# Patient Record
Sex: FEMALE | Race: WHITE | NOT HISPANIC OR LATINO | URBAN - METROPOLITAN AREA
[De-identification: names, ages, dates, MRNs, and addresses within clinical notes are randomized per-mention and may not be internally consistent; named-entity substitution may affect disease eponyms.]

---

## 2021-11-13 ENCOUNTER — WEB ENCOUNTER (OUTPATIENT)
Dept: URBAN - METROPOLITAN AREA CLINIC 35 | Facility: CLINIC | Age: 61
End: 2021-11-13

## 2022-01-13 ENCOUNTER — WEB ENCOUNTER (OUTPATIENT)
Dept: URBAN - METROPOLITAN AREA CLINIC 35 | Facility: CLINIC | Age: 62
End: 2022-01-13

## 2022-01-25 ENCOUNTER — OFFICE VISIT (OUTPATIENT)
Dept: URBAN - METROPOLITAN AREA CLINIC 31 | Facility: CLINIC | Age: 62
End: 2022-01-25
Payer: COMMERCIAL

## 2022-01-25 VITALS
BODY MASS INDEX: 27.31 KG/M2 | WEIGHT: 160 LBS | OXYGEN SATURATION: 98 % | HEIGHT: 64 IN | HEART RATE: 87 BPM | DIASTOLIC BLOOD PRESSURE: 86 MMHG | SYSTOLIC BLOOD PRESSURE: 138 MMHG

## 2022-01-25 DIAGNOSIS — Z86.010 HX OF ADENOMATOUS COLONIC POLYPS: ICD-10-CM

## 2022-01-25 DIAGNOSIS — K62.5 RECTAL BLEEDING: ICD-10-CM

## 2022-01-25 DIAGNOSIS — K58.9 IRRITABLE BOWEL SYNDROME, UNSPECIFIED TYPE: ICD-10-CM

## 2022-01-25 PROCEDURE — 99203 OFFICE O/P NEW LOW 30 MIN: CPT | Performed by: INTERNAL MEDICINE

## 2022-01-25 RX ORDER — DICYCLOMINE HYDROCHLORIDE 20 MG/1
1 TABLET ORAL
Status: ACTIVE | COMMUNITY

## 2022-01-25 RX ORDER — SODIUM PICOSULFATE, MAGNESIUM OXIDE, AND ANHYDROUS CITRIC ACID 10; 3.5; 12 MG/160ML; G/160ML; G/160ML
160 ML LIQUID ORAL
Qty: 320 MILLILITER | Refills: 0 | OUTPATIENT
Start: 2022-01-25 | End: 2022-01-26

## 2022-01-25 RX ORDER — MELOXICAM 7.5 MG/1
1 TABLET TABLET ORAL ONCE A DAY
Status: DISCONTINUED | COMMUNITY

## 2022-01-25 RX ORDER — ATORVASTATIN CALCIUM 10 MG/1
1 TABLET TABLET, FILM COATED ORAL ONCE A DAY
Status: ACTIVE | COMMUNITY

## 2022-01-25 RX ORDER — LORATADINE 10 MG
1 TABLET TABLET ORAL ONCE A DAY
Status: ACTIVE | COMMUNITY

## 2022-01-25 NOTE — HPI-IBS-D
Patient presents today to discuss her bowel habits. She has also had some rectal bleeding and states that she has hemorrhoids. Patient is currently having 2 bowel movements per day. Her stools are always normal to softly formed. She admits blood in her stool and states that the color is bright red. Patient admits seeing blood on the tissue after wiping and also will sometimes stain the toilet water. Patient states that the rectal bleeding can be with and without having a bowel movement. She has not seen any mucous in her stool. Patient denies melana. admits to lower abdominal cramping with urgency and sometimes she will not have a bowel movement. The cramping and urgency will sometimes resolve on its own. She currently uses Dicyclomine 20 mg, once every night. The Dicyclomine helps, patient states that "it clams things down". Patient admits associated bloating and gas. When patient has a bowel movement she states that she does not feel like she is completely emptying her bowels. Patient denies fecal incontinence (conscious/unconscious).  Patient denies recently drinking lake or well water. She took antibiotics for dental work, about 6 months ago. Patient used to take Meloxicam, daily, and stopped about 8 weeks ago. She took this for about a year. She had a screening colonoscopy in 2016. Patient was referred to GI for a colonoscopy to asses her symptoms. Previous colonoscopy report and pathology report have been scanned into EMR.

## 2022-01-25 NOTE — PHYSICAL EXAM GASTROINTESTINAL
Abdomen,  soft, mildly tender lower abdomen, nondistended,  no guarding or rigidity,  no masses palpable,  normal bowel sounds

## 2022-01-31 ENCOUNTER — OFFICE VISIT (OUTPATIENT)
Dept: URBAN - METROPOLITAN AREA SURGERY CENTER 8 | Facility: SURGERY CENTER | Age: 62
End: 2022-01-31
Payer: COMMERCIAL

## 2022-01-31 DIAGNOSIS — K62.5 RECTAL BLEEDING: ICD-10-CM

## 2022-01-31 DIAGNOSIS — K63.5 BENIGN COLON POLYP: ICD-10-CM

## 2022-01-31 DIAGNOSIS — D12.3 ADENOMA OF TRANSVERSE COLON: ICD-10-CM

## 2022-01-31 PROCEDURE — 45380 COLONOSCOPY AND BIOPSY: CPT | Performed by: INTERNAL MEDICINE

## 2022-01-31 PROCEDURE — G8907 PT DOC NO EVENTS ON DISCHARG: HCPCS | Performed by: INTERNAL MEDICINE

## 2022-01-31 PROCEDURE — 45385 COLONOSCOPY W/LESION REMOVAL: CPT | Performed by: INTERNAL MEDICINE

## 2022-02-18 ENCOUNTER — OFFICE VISIT (OUTPATIENT)
Dept: URBAN - METROPOLITAN AREA CLINIC 31 | Facility: CLINIC | Age: 62
End: 2022-02-18
Payer: COMMERCIAL

## 2022-02-18 VITALS
OXYGEN SATURATION: 96 % | BODY MASS INDEX: 26.98 KG/M2 | SYSTOLIC BLOOD PRESSURE: 122 MMHG | WEIGHT: 158 LBS | HEIGHT: 64 IN | HEART RATE: 92 BPM | DIASTOLIC BLOOD PRESSURE: 72 MMHG

## 2022-02-18 DIAGNOSIS — K57.30 DIVERTICULOSIS OF LARGE INTESTINE WITHOUT PERFORATION OR ABSCESS WITHOUT BLEEDING: ICD-10-CM

## 2022-02-18 DIAGNOSIS — D12.3 ADENOMATOUS POLYP OF TRANSVERSE COLON: ICD-10-CM

## 2022-02-18 DIAGNOSIS — K62.5 RECTAL BLEEDING: ICD-10-CM

## 2022-02-18 DIAGNOSIS — K64.2 THIRD DEGREE HEMORRHOIDS: ICD-10-CM

## 2022-02-18 DIAGNOSIS — Z86.010 HX OF ADENOMATOUS COLONIC POLYPS: ICD-10-CM

## 2022-02-18 DIAGNOSIS — K58.9 IRRITABLE BOWEL SYNDROME, UNSPECIFIED TYPE: ICD-10-CM

## 2022-02-18 PROCEDURE — 99213 OFFICE O/P EST LOW 20 MIN: CPT | Performed by: INTERNAL MEDICINE

## 2022-02-18 RX ORDER — DICYCLOMINE HYDROCHLORIDE 20 MG/1
1 TABLET ORAL
Status: ACTIVE | COMMUNITY

## 2022-02-18 RX ORDER — LORATADINE 10 MG
1 TABLET TABLET ORAL ONCE A DAY
Status: ACTIVE | COMMUNITY

## 2022-02-18 RX ORDER — AMITRIPTYLINE HYDROCHLORIDE 10 MG/1
1 TABLET AT BEDTIME TABLET, FILM COATED ORAL ONCE A DAY
Qty: 30 | Refills: 3 | OUTPATIENT
Start: 2022-02-18

## 2022-02-18 RX ORDER — ATORVASTATIN CALCIUM 10 MG/1
1 TABLET TABLET, FILM COATED ORAL ONCE A DAY
Status: ACTIVE | COMMUNITY

## 2022-02-18 NOTE — HPI-COLONOSCOPY FOLLOWUP
Patient presents today for a follow-up after the colonoscopy that was done on 01/31/2022. Since the procedure patient states that she is having 1-2 bowel movements per day with normal and formed stools. Patient denies seeing any blood or mucous in her stool. Patient denies melena. Patient states that she had no complications following her colonoscopy. Patient did add in the Benefiber, 1 tablespoonful, but she is not taking it daily. She denies improvement in the urgency and lower abdominal cramping. Patient denies any rectal bleeding since having the colonoscopy.

## 2022-03-14 ENCOUNTER — ERX REFILL RESPONSE (OUTPATIENT)
Dept: URBAN - METROPOLITAN AREA CLINIC 31 | Facility: CLINIC | Age: 62
End: 2022-03-14

## 2022-03-14 RX ORDER — AMITRIPTYLINE HYDROCHLORIDE 10 MG/1
1 TABLET AT BEDTIME TABLET, FILM COATED ORAL ONCE A DAY
Qty: 30 | Refills: 3 | OUTPATIENT

## 2022-03-14 RX ORDER — AMITRIPTYLINE HYDROCHLORIDE 10 MG/1
TAKE 1 TABLET BY MOUTH EVERY DAY AT BEDTIME FOR 30 DAYS TABLET, FILM COATED ORAL
Qty: 30 TABLET | Refills: 4 | OUTPATIENT

## 2022-03-29 ENCOUNTER — OFFICE VISIT (OUTPATIENT)
Dept: URBAN - METROPOLITAN AREA CLINIC 31 | Facility: CLINIC | Age: 62
End: 2022-03-29
Payer: COMMERCIAL

## 2022-03-29 VITALS
HEART RATE: 90 BPM | DIASTOLIC BLOOD PRESSURE: 70 MMHG | HEIGHT: 64 IN | OXYGEN SATURATION: 97 % | SYSTOLIC BLOOD PRESSURE: 112 MMHG | BODY MASS INDEX: 27.83 KG/M2 | WEIGHT: 163 LBS

## 2022-03-29 DIAGNOSIS — D12.3 ADENOMATOUS POLYP OF TRANSVERSE COLON: ICD-10-CM

## 2022-03-29 DIAGNOSIS — K57.30 DIVERTICULOSIS OF LARGE INTESTINE WITHOUT PERFORATION OR ABSCESS WITHOUT BLEEDING: ICD-10-CM

## 2022-03-29 DIAGNOSIS — Z86.010 HX OF ADENOMATOUS COLONIC POLYPS: ICD-10-CM

## 2022-03-29 DIAGNOSIS — K64.2 THIRD DEGREE HEMORRHOIDS: ICD-10-CM

## 2022-03-29 DIAGNOSIS — K62.5 RECTAL BLEEDING: ICD-10-CM

## 2022-03-29 DIAGNOSIS — K58.9 IRRITABLE BOWEL SYNDROME, UNSPECIFIED TYPE: ICD-10-CM

## 2022-03-29 PROCEDURE — 99213 OFFICE O/P EST LOW 20 MIN: CPT | Performed by: INTERNAL MEDICINE

## 2022-03-29 RX ORDER — ATORVASTATIN CALCIUM 10 MG/1
1 TABLET TABLET, FILM COATED ORAL ONCE A DAY
Status: ACTIVE | COMMUNITY

## 2022-03-29 RX ORDER — DICYCLOMINE HYDROCHLORIDE 20 MG/1
1 TABLET ORAL
Status: ACTIVE | COMMUNITY

## 2022-03-29 RX ORDER — AMITRIPTYLINE HYDROCHLORIDE 10 MG/1
2 TABLETS AT BEDTIME TABLET, FILM COATED ORAL ONCE A DAY
Qty: 60 TABLET | Refills: 5 | OUTPATIENT

## 2022-03-29 RX ORDER — LORATADINE 10 MG
1 TABLET TABLET ORAL ONCE A DAY
Status: ACTIVE | COMMUNITY

## 2022-03-29 RX ORDER — AMITRIPTYLINE HYDROCHLORIDE 10 MG/1
TAKE 1 TABLET BY MOUTH EVERY DAY AT BEDTIME FOR 30 DAYS TABLET, FILM COATED ORAL
Qty: 30 TABLET | Refills: 4 | Status: ACTIVE | COMMUNITY

## 2022-03-29 NOTE — HPI-IBS
Patient presents today for a follow-up office visit from 02/18/2022. She is currently taking Amitriptyline 10 mg, daily and Benefiber 1 tablespoonful daily. Patient is currently having 2 normal and formed bowel movements per day. She denies any blood or mucous in her stool. Patient denies melena. She has not had to use the Dicyclomine very much, if at all. Patient states that she has been feeling well and has not had any abdominal pain episodes. She would like to discuss possibly going up in strength with the Amitriptyline.   She feels stomach is a bit more upset later in the day.

## 2022-06-16 ENCOUNTER — ERX REFILL RESPONSE (OUTPATIENT)
Dept: URBAN - METROPOLITAN AREA CLINIC 31 | Facility: CLINIC | Age: 62
End: 2022-06-16

## 2022-06-16 RX ORDER — AMITRIPTYLINE HYDROCHLORIDE 10 MG/1
2 TABLETS AT BEDTIME TABLET, FILM COATED ORAL ONCE A DAY
Qty: 60 TABLET | Refills: 5 | OUTPATIENT

## 2022-06-21 ENCOUNTER — OFFICE VISIT (OUTPATIENT)
Dept: URBAN - METROPOLITAN AREA CLINIC 31 | Facility: CLINIC | Age: 62
End: 2022-06-21
Payer: COMMERCIAL

## 2022-06-21 VITALS
HEIGHT: 64 IN | HEART RATE: 91 BPM | OXYGEN SATURATION: 98 % | BODY MASS INDEX: 27.31 KG/M2 | SYSTOLIC BLOOD PRESSURE: 130 MMHG | DIASTOLIC BLOOD PRESSURE: 86 MMHG | WEIGHT: 160 LBS

## 2022-06-21 DIAGNOSIS — D12.3 ADENOMATOUS POLYP OF TRANSVERSE COLON: ICD-10-CM

## 2022-06-21 DIAGNOSIS — K64.2 THIRD DEGREE HEMORRHOIDS: ICD-10-CM

## 2022-06-21 DIAGNOSIS — K58.9 IRRITABLE BOWEL SYNDROME, UNSPECIFIED TYPE: ICD-10-CM

## 2022-06-21 DIAGNOSIS — K62.5 RECTAL BLEEDING: ICD-10-CM

## 2022-06-21 DIAGNOSIS — K57.30 DIVERTICULOSIS OF LARGE INTESTINE WITHOUT PERFORATION OR ABSCESS WITHOUT BLEEDING: ICD-10-CM

## 2022-06-21 DIAGNOSIS — Z86.010 HX OF ADENOMATOUS COLONIC POLYPS: ICD-10-CM

## 2022-06-21 PROCEDURE — 99214 OFFICE O/P EST MOD 30 MIN: CPT | Performed by: INTERNAL MEDICINE

## 2022-06-21 RX ORDER — AMITRIPTYLINE HYDROCHLORIDE 25 MG/1
1 TABLET AT BEDTIME TABLET, FILM COATED ORAL ONCE A DAY
Qty: 30 TABLET | Refills: 5 | OUTPATIENT

## 2022-06-21 RX ORDER — LORATADINE 10 MG
1 TABLET TABLET ORAL ONCE A DAY
Status: ACTIVE | COMMUNITY

## 2022-06-21 RX ORDER — ATORVASTATIN CALCIUM 10 MG/1
1 TABLET TABLET, FILM COATED ORAL ONCE A DAY
Status: ACTIVE | COMMUNITY

## 2022-06-21 RX ORDER — AMITRIPTYLINE HYDROCHLORIDE 10 MG/1
TAKE 1 TABLET BY MOUTH EVERY DAY AT BEDTIME FOR 30 DAYS TABLET, FILM COATED ORAL
Qty: 30 TABLET | Refills: 4 | Status: ACTIVE | COMMUNITY

## 2022-06-21 RX ORDER — AMITRIPTYLINE HYDROCHLORIDE 10 MG/1
2 TABLETS AT BEDTIME TABLET, FILM COATED ORAL ONCE A DAY
Qty: 60 TABLET | Refills: 5 | Status: DISCONTINUED | COMMUNITY

## 2022-06-21 RX ORDER — SODIUM PICOSULFATE, MAGNESIUM OXIDE, AND ANHYDROUS CITRIC ACID 10; 3.5; 12 MG/160ML; G/160ML; G/160ML
160 ML LIQUID ORAL
Qty: 320 ML | Refills: 0 | OUTPATIENT
Start: 2022-06-21 | End: 2022-06-22

## 2022-06-21 RX ORDER — DICYCLOMINE HYDROCHLORIDE 20 MG/1
1 TABLET ORAL
Status: ACTIVE | COMMUNITY

## 2022-06-21 NOTE — HPI-IBS
Patient presents today for a follow-up office visit from 03/29/2022. She is currently taking Amitriptyline 10 mg, daily and Benefiber 1 tablespoonful daily. She tried taking the 20 mg of Amitriptyline at 20 mg daily and did not notice much of a difference. Patient is currently having 2 bowel movements per day (in the mornings) with normal to loose stools. She admits mucous in her stool. Patient denies melena. Patient admits intermittent blood in her stool and this is not a new issue. Last night she woke up and felt fecal urgency. She "pushed a little and was unable to produce a bowel movement. When she finished she realized that there was bright red blood in the toilet. She believes that there might have been some blood clots that passed. The blood stained the toilet bowel. Patient states that she has used Dicyclomine "a couple of times", since her last office visit. She continues to admit intermittent abdominal cramping. Patient is due for a repeat colonoscopy to reassess polypectomy site. Patient is now taking magnesium at night.

## 2022-07-17 ENCOUNTER — ERX REFILL RESPONSE (OUTPATIENT)
Dept: URBAN - METROPOLITAN AREA CLINIC 31 | Facility: CLINIC | Age: 62
End: 2022-07-17

## 2022-07-17 RX ORDER — AMITRIPTYLINE HYDROCHLORIDE 25 MG/1
TAKE 1 TABLET BY MOUTH EVERY DAY AT BEDTIME FOR 30 DAYS TABLET, FILM COATED ORAL
Qty: 30 TABLET | Refills: 5 | OUTPATIENT

## 2022-07-17 RX ORDER — AMITRIPTYLINE HYDROCHLORIDE 25 MG/1
1 TABLET AT BEDTIME TABLET, FILM COATED ORAL ONCE A DAY
Qty: 30 TABLET | Refills: 6 | OUTPATIENT

## 2022-08-16 ENCOUNTER — WEB ENCOUNTER (OUTPATIENT)
Dept: URBAN - METROPOLITAN AREA SURGERY CENTER 8 | Facility: SURGERY CENTER | Age: 62
End: 2022-08-16

## 2022-08-22 ENCOUNTER — OFFICE VISIT (OUTPATIENT)
Dept: URBAN - METROPOLITAN AREA SURGERY CENTER 8 | Facility: SURGERY CENTER | Age: 62
End: 2022-08-22
Payer: COMMERCIAL

## 2022-08-22 DIAGNOSIS — Z86.010 ADENOMAS PERSONAL HISTORY OF COLONIC POLYPS: ICD-10-CM

## 2022-08-22 DIAGNOSIS — K63.5 BENIGN COLON POLYP: ICD-10-CM

## 2022-08-22 PROCEDURE — G8907 PT DOC NO EVENTS ON DISCHARG: HCPCS | Performed by: INTERNAL MEDICINE

## 2022-08-22 PROCEDURE — 45380 COLONOSCOPY AND BIOPSY: CPT | Performed by: INTERNAL MEDICINE

## 2022-09-13 ENCOUNTER — OFFICE VISIT (OUTPATIENT)
Dept: URBAN - METROPOLITAN AREA CLINIC 31 | Facility: CLINIC | Age: 62
End: 2022-09-13
Payer: COMMERCIAL

## 2022-09-13 VITALS
OXYGEN SATURATION: 97 % | DIASTOLIC BLOOD PRESSURE: 84 MMHG | SYSTOLIC BLOOD PRESSURE: 126 MMHG | HEART RATE: 96 BPM | BODY MASS INDEX: 27.49 KG/M2 | HEIGHT: 64 IN | WEIGHT: 161 LBS

## 2022-09-13 DIAGNOSIS — D12.3 ADENOMATOUS POLYP OF TRANSVERSE COLON: ICD-10-CM

## 2022-09-13 DIAGNOSIS — K58.9 IRRITABLE BOWEL SYNDROME, UNSPECIFIED TYPE: ICD-10-CM

## 2022-09-13 DIAGNOSIS — K64.2 THIRD DEGREE HEMORRHOIDS: ICD-10-CM

## 2022-09-13 DIAGNOSIS — Z86.010 HX OF ADENOMATOUS COLONIC POLYPS: ICD-10-CM

## 2022-09-13 DIAGNOSIS — K62.5 RECTAL BLEEDING: ICD-10-CM

## 2022-09-13 DIAGNOSIS — K57.30 DIVERTICULOSIS OF LARGE INTESTINE WITHOUT PERFORATION OR ABSCESS WITHOUT BLEEDING: ICD-10-CM

## 2022-09-13 DIAGNOSIS — K62.1 HYPERPLASTIC RECTAL POLYP: ICD-10-CM

## 2022-09-13 DIAGNOSIS — K63.5 HYPERPLASTIC COLONIC POLYP, UNSPECIFIED PART OF COLON: ICD-10-CM

## 2022-09-13 PROBLEM — 429047008: Status: ACTIVE | Noted: 2022-01-25

## 2022-09-13 PROBLEM — 10743008 IRRITABLE BOWEL SYNDROME: Status: ACTIVE | Noted: 2022-02-18

## 2022-09-13 PROBLEM — 724538004: Status: ACTIVE | Noted: 2022-02-18

## 2022-09-13 PROCEDURE — 99213 OFFICE O/P EST LOW 20 MIN: CPT | Performed by: INTERNAL MEDICINE

## 2022-09-13 RX ORDER — AMITRIPTYLINE HYDROCHLORIDE 25 MG/1
TAKE 1 TABLET BY MOUTH EVERY DAY AT BEDTIME FOR 30 DAYS TABLET, FILM COATED ORAL
Qty: 30 TABLET | Refills: 5 | Status: ACTIVE | COMMUNITY

## 2022-09-13 RX ORDER — LORATADINE 10 MG
1 TABLET TABLET ORAL ONCE A DAY
Status: ACTIVE | COMMUNITY

## 2022-09-13 RX ORDER — ATORVASTATIN CALCIUM 10 MG/1
1 TABLET TABLET, FILM COATED ORAL ONCE A DAY
Status: ACTIVE | COMMUNITY

## 2022-09-13 RX ORDER — AMITRIPTYLINE HYDROCHLORIDE 25 MG/1
1 TABLET AT BEDTIME TABLET, FILM COATED ORAL ONCE A DAY
Qty: 30 TABLET | Refills: 5 | OUTPATIENT

## 2022-09-13 RX ORDER — AMITRIPTYLINE HYDROCHLORIDE 10 MG/1
TAKE 1 TABLET BY MOUTH EVERY DAY AT BEDTIME FOR 30 DAYS TABLET, FILM COATED ORAL
Qty: 30 TABLET | Refills: 4 | Status: DISCONTINUED | COMMUNITY

## 2022-09-13 RX ORDER — DICYCLOMINE HYDROCHLORIDE 20 MG/1
1 TABLET ORAL
Status: ACTIVE | COMMUNITY

## 2022-09-13 NOTE — HPI-COLONOSCOPY FOLLOWUP
Patient presents today for a follow-up after the colonoscopy that was done on 08/22/2022. Since the procedure patient states that she is having 1-2 bowel movements per day with normal and formed stools. Patient denies seeing any blood or mucous in the stool. While patient has not had blood in her stool, she admits to rectal bleeding. Patient denies melena. Patient states that she had no complications following her colonoscopy. Patient continues taking Amitriptyline 25 mg, daily and Benefiber, 1 tablespoonful daily. She denies improvement in the loose stools and fecal urgency, "they are about the same". Patient admits improvement in the lower abdominal cramping. She admits rectal bleeding since the colonoscopy and a couple of times this was enough to stain the toilet water. She would like to discuss the hemorrhoids.   Colonoscopy Findings : Hemorrhoids were found on perianal exam. The digital and rectal exam was normal. Pertinent negatives include normal sphincter tone and no palpable rectal lesions. Many medium-mouthed diverticula were found in the sigmoid colon and descending colon. A 2 mm polyp was found in the descending colon and was sessile. The polyp was removed with a cold biopsy forceps. Resection and retrieval were complete and estimated blood loss was minimal. Four sessile polyps were found in the sigmoid colon. The polyps were 1 to 3 mm in size. These polyps were removed with a cold biopsy forceps. Resection and retrieval were complete and estimated blood loss was minimal. Two sessile polyps were found in the rectum. The polyps were 2 to 3 mm in size. These polyps were removed with a cold biopsy forceps. Resection and retrieval were complete and estimated blood loss was minimal. No residual polyp seen in transverse colon. Non-bleeding internal hemorrhoids were found during retroflexion. The hemorrhoids were medium-sized and Grade 3 (internal hemorrhoids that prolapse but require manual reduction).  PATHOLOGY : COLON, Descending, polyp : Hyperplastic polyp. COLON, Sigmoid, polyp : Hyperplastic polyp, fragment of. Benign colonic mucosa with lymphoid aggregate, fragment of. COLON, Rectum, Polyp : Hyperplastic polyp, fragments of.

## 2023-01-25 ENCOUNTER — ERX REFILL RESPONSE (OUTPATIENT)
Dept: URBAN - METROPOLITAN AREA CLINIC 31 | Facility: CLINIC | Age: 63
End: 2023-01-25

## 2023-01-25 RX ORDER — AMITRIPTYLINE HYDROCHLORIDE 25 MG/1
TAKE 1 TABLET BY MOUTH EVERY DAY AT BEDTIME FOR 30 DAYS TABLET, FILM COATED ORAL
Qty: 30 TABLET | Refills: 5 | OUTPATIENT

## 2023-01-25 RX ORDER — AMITRIPTYLINE HYDROCHLORIDE 25 MG/1
TAKE 1 TABLET BY MOUTH EVERY DAY AT BEDTIME TABLET, FILM COATED ORAL
Qty: 90 TABLET | Refills: 1 | OUTPATIENT

## 2023-03-24 ENCOUNTER — DASHBOARD ENCOUNTERS (OUTPATIENT)
Age: 63
End: 2023-03-24

## 2023-03-28 ENCOUNTER — OFFICE VISIT (OUTPATIENT)
Dept: URBAN - METROPOLITAN AREA CLINIC 31 | Facility: CLINIC | Age: 63
End: 2023-03-28
Payer: COMMERCIAL

## 2023-03-28 VITALS
BODY MASS INDEX: 28.85 KG/M2 | OXYGEN SATURATION: 98 % | HEART RATE: 97 BPM | DIASTOLIC BLOOD PRESSURE: 86 MMHG | SYSTOLIC BLOOD PRESSURE: 132 MMHG | HEIGHT: 64 IN | WEIGHT: 169 LBS

## 2023-03-28 DIAGNOSIS — K64.2 THIRD DEGREE HEMORRHOIDS: ICD-10-CM

## 2023-03-28 DIAGNOSIS — D12.3 ADENOMATOUS POLYP OF TRANSVERSE COLON: ICD-10-CM

## 2023-03-28 DIAGNOSIS — Z86.010 HX OF ADENOMATOUS COLONIC POLYPS: ICD-10-CM

## 2023-03-28 DIAGNOSIS — K63.5 HYPERPLASTIC COLONIC POLYP, UNSPECIFIED PART OF COLON: ICD-10-CM

## 2023-03-28 DIAGNOSIS — K57.30 DIVERTICULOSIS OF LARGE INTESTINE WITHOUT PERFORATION OR ABSCESS WITHOUT BLEEDING: ICD-10-CM

## 2023-03-28 DIAGNOSIS — K62.5 RECTAL BLEEDING: ICD-10-CM

## 2023-03-28 DIAGNOSIS — K58.9 IRRITABLE BOWEL SYNDROME, UNSPECIFIED TYPE: ICD-10-CM

## 2023-03-28 DIAGNOSIS — K62.1 HYPERPLASTIC RECTAL POLYP: ICD-10-CM

## 2023-03-28 DIAGNOSIS — K58.8 OTHER IRRITABLE BOWEL SYNDROME: ICD-10-CM

## 2023-03-28 PROCEDURE — 99213 OFFICE O/P EST LOW 20 MIN: CPT | Performed by: INTERNAL MEDICINE

## 2023-03-28 RX ORDER — LORATADINE 10 MG
1 TABLET TABLET ORAL ONCE A DAY
Status: ACTIVE | COMMUNITY

## 2023-03-28 RX ORDER — AMITRIPTYLINE HYDROCHLORIDE 25 MG/1
1 TABLET AT BEDTIME TABLET, FILM COATED ORAL ONCE A DAY
Qty: 90 TABLET | Refills: 3

## 2023-03-28 RX ORDER — DICYCLOMINE HYDROCHLORIDE 20 MG/1
1 TABLET ORAL
Qty: 60 TABLETS | Refills: 3

## 2023-03-28 RX ORDER — AMITRIPTYLINE HYDROCHLORIDE 25 MG/1
TAKE 1 TABLET BY MOUTH EVERY DAY AT BEDTIME TABLET, FILM COATED ORAL
Qty: 90 TABLET | Refills: 1 | Status: ACTIVE | COMMUNITY

## 2023-03-28 RX ORDER — DICYCLOMINE HYDROCHLORIDE 20 MG/1
1 TABLET ORAL
Status: ACTIVE | COMMUNITY

## 2023-03-28 RX ORDER — ATORVASTATIN CALCIUM 10 MG/1
1 TABLET TABLET, FILM COATED ORAL ONCE A DAY
Status: ACTIVE | COMMUNITY

## 2023-03-28 NOTE — HPI-IBS
Patient presents today for a follow-up office visit from 09/13/2022. She is currently taking Amitriptyline 25 mg, daily and Benefiber 1 tablespoonful daily. Patient is currently having 2 bowel movements per day with normal to softly formed stools. The mucous has improved, and she has no mucous in her stool, currently. Patient denies melena. Patient admits intermittent blood in her stool (from hemorrhoids), but none currently. She denies improvement in fecal urgency and wonders if the hemorrhoids could be contributing to the urgency. Patient admits improvement in abdominal cramping. Patient has been given the name of Dr. Edward and Dr. Fajardo, for hemorrhoid evaluation. She would really like to see Dr. Fajardo. Of note, patient is taking daily Meloxicam at 7.5 mg, daily.

## 2024-10-29 ENCOUNTER — LAB OUTSIDE AN ENCOUNTER (OUTPATIENT)
Dept: URBAN - METROPOLITAN AREA CLINIC 111 | Facility: CLINIC | Age: 64
End: 2024-10-29

## 2024-10-29 ENCOUNTER — OFFICE VISIT (OUTPATIENT)
Dept: URBAN - METROPOLITAN AREA CLINIC 111 | Facility: CLINIC | Age: 64
End: 2024-10-29
Payer: MEDICARE

## 2024-10-29 VITALS
HEIGHT: 64 IN | TEMPERATURE: 97.9 F | SYSTOLIC BLOOD PRESSURE: 122 MMHG | HEART RATE: 76 BPM | WEIGHT: 175.4 LBS | DIASTOLIC BLOOD PRESSURE: 80 MMHG | BODY MASS INDEX: 29.94 KG/M2

## 2024-10-29 DIAGNOSIS — Z86.0101 HISTORY OF ADENOMATOUS POLYP OF COLON: ICD-10-CM

## 2024-10-29 DIAGNOSIS — K64.8 INTERNAL HEMORRHOIDS: ICD-10-CM

## 2024-10-29 DIAGNOSIS — D50.9 IRON DEFICIENCY ANEMIA: ICD-10-CM

## 2024-10-29 PROCEDURE — 99214 OFFICE O/P EST MOD 30 MIN: CPT | Performed by: PHYSICIAN ASSISTANT

## 2024-10-29 RX ORDER — DICYCLOMINE HYDROCHLORIDE 20 MG/1
1 TABLET ORAL
Qty: 60 TABLETS | Refills: 3 | Status: ON HOLD | COMMUNITY

## 2024-10-29 RX ORDER — AMITRIPTYLINE HYDROCHLORIDE 25 MG/1
1 TABLET AT BEDTIME TABLET, FILM COATED ORAL ONCE A DAY
Qty: 90 TABLET | Refills: 3 | Status: ON HOLD | COMMUNITY

## 2024-10-29 RX ORDER — ATORVASTATIN CALCIUM 10 MG/1
1 TABLET TABLET, FILM COATED ORAL ONCE A DAY
Status: ACTIVE | COMMUNITY

## 2024-10-29 RX ORDER — LORATADINE 10 MG
1 TABLET TABLET ORAL ONCE A DAY
Status: ON HOLD | COMMUNITY

## 2024-10-29 RX ORDER — AMITRIPTYLINE HYDROCHLORIDE 25 MG/1
TAKE 1 TABLET BY MOUTH EVERY DAY AT BEDTIME TABLET, FILM COATED ORAL
Qty: 90 TABLET | Refills: 1 | Status: ON HOLD | COMMUNITY

## 2024-10-29 NOTE — HPI-TODAY'S VISIT:
65 y/o female here with anemia. Seen in 3/2023 by Dr. Rome for IBS, third degree hemorrhoids, hyperplastic colon polyps, rectal bleeding, h/o adenomatous colon polyps, and diverticulosis. Pt had refills of Dicyclomine and Amitriptyline. She was going to see CRS for her hemorrhoids. Last colonoscopy in 8/2022 with hemorrhoids, 7 polyps, and diverticulosis in the left colon. Path with hyperplastic polyps. Colon in 1/2022 with 3 polyps one of which was 20 mm. Path with tubulovillous adenoma and adenomatous changes. Repeat colon was recommended in 5 years after the second colonoscopy.  She reports she is anemic. No overt bleeding. She had labs in August: Hgb 8.3, Hct 29.5. She was started on iron. Labs from 9/24: Hgb 10.7, Hct 35.3. Ferritin 14 on 9.6. Iron low at 26 on 9/6.  She was having fatigue and SOB starting in the Spring. Symptoms are improving since starting iron. She did calcium score test and EKG which were fine. She had CXR too that was fine. She states it showed a hiatal hernia. No previous EGD. No heartburn. She states she had some 10 years ago and took OTC meds at that time.  She went to [facility] [place] and had banding of hemorrhoids in the second half of last year. She has not had rectal bleeding since then. Pt has a stool daily. No weight loss. She reports weight gain. No abdominal pain. She is no longer taking Amitriptyline or Dicyclomine. She is on Cymbalta now. No FH of colon cancer in first-degree relatives. She thinks aunts had colon cancer when they got older. No heart, lung, or kidney problems.

## 2024-10-30 PROBLEM — 721705006: Status: ACTIVE | Noted: 2024-10-30

## 2024-11-11 ENCOUNTER — CLAIMS CREATED FROM THE CLAIM WINDOW (OUTPATIENT)
Dept: URBAN - METROPOLITAN AREA SURGERY CENTER 15 | Facility: SURGERY CENTER | Age: 64
End: 2024-11-11
Payer: MEDICARE

## 2024-11-11 ENCOUNTER — CLAIMS CREATED FROM THE CLAIM WINDOW (OUTPATIENT)
Dept: URBAN - METROPOLITAN AREA CLINIC 4 | Facility: CLINIC | Age: 64
End: 2024-11-11
Payer: MEDICARE

## 2024-11-11 DIAGNOSIS — K31.89 REACTIVE GASTROPATHY: ICD-10-CM

## 2024-11-11 DIAGNOSIS — Z86.0100 HISTORY OF COLON POLYPS: ICD-10-CM

## 2024-11-11 DIAGNOSIS — D12.3 ADENOMA OF TRANSVERSE COLON: ICD-10-CM

## 2024-11-11 DIAGNOSIS — D12.3 BENIGN NEOPLASM OF TRANSVERSE COLON: ICD-10-CM

## 2024-11-11 DIAGNOSIS — D50.9 ANEMIA: ICD-10-CM

## 2024-11-11 DIAGNOSIS — Z86.0100 PERSONAL HISTORY OF COLONIC POLYPS: ICD-10-CM

## 2024-11-11 DIAGNOSIS — K29.70 GASTRITIS, UNSPECIFIED, WITHOUT BLEEDING: ICD-10-CM

## 2024-11-11 DIAGNOSIS — K31.89 OTHER DISEASES OF STOMACH AND DUODENUM: ICD-10-CM

## 2024-11-11 PROCEDURE — 45385 COLONOSCOPY W/LESION REMOVAL: CPT | Performed by: CLINIC/CENTER

## 2024-11-11 PROCEDURE — 88305 TISSUE EXAM BY PATHOLOGIST: CPT | Performed by: PATHOLOGY

## 2024-11-11 PROCEDURE — 45385 COLONOSCOPY W/LESION REMOVAL: CPT | Performed by: INTERNAL MEDICINE

## 2024-11-11 PROCEDURE — 88312 SPECIAL STAINS GROUP 1: CPT | Performed by: PATHOLOGY

## 2024-11-11 PROCEDURE — 43239 EGD BIOPSY SINGLE/MULTIPLE: CPT | Performed by: INTERNAL MEDICINE

## 2024-11-11 PROCEDURE — 00813 ANES UPR LWR GI NDSC PX: CPT | Performed by: NURSE ANESTHETIST, CERTIFIED REGISTERED

## 2024-11-11 PROCEDURE — 43239 EGD BIOPSY SINGLE/MULTIPLE: CPT | Performed by: CLINIC/CENTER

## 2024-11-11 RX ORDER — LORATADINE 10 MG
1 TABLET TABLET ORAL ONCE A DAY
Status: ON HOLD | COMMUNITY

## 2024-11-11 RX ORDER — DICYCLOMINE HYDROCHLORIDE 20 MG/1
1 TABLET ORAL
Qty: 60 TABLETS | Refills: 3 | Status: ON HOLD | COMMUNITY

## 2024-11-11 RX ORDER — ATORVASTATIN CALCIUM 10 MG/1
1 TABLET TABLET, FILM COATED ORAL ONCE A DAY
Status: ACTIVE | COMMUNITY

## 2024-11-11 RX ORDER — AMITRIPTYLINE HYDROCHLORIDE 25 MG/1
TAKE 1 TABLET BY MOUTH EVERY DAY AT BEDTIME TABLET, FILM COATED ORAL
Qty: 90 TABLET | Refills: 1 | Status: ON HOLD | COMMUNITY

## 2024-11-11 RX ORDER — AMITRIPTYLINE HYDROCHLORIDE 25 MG/1
1 TABLET AT BEDTIME TABLET, FILM COATED ORAL ONCE A DAY
Qty: 90 TABLET | Refills: 3 | Status: ON HOLD | COMMUNITY

## 2024-11-18 ENCOUNTER — TELEPHONE ENCOUNTER (OUTPATIENT)
Dept: URBAN - METROPOLITAN AREA CLINIC 23 | Facility: CLINIC | Age: 64
End: 2024-11-18

## 2024-11-20 ENCOUNTER — OFFICE VISIT (OUTPATIENT)
Dept: URBAN - METROPOLITAN AREA CLINIC 111 | Facility: CLINIC | Age: 64
End: 2024-11-20

## 2024-12-03 ENCOUNTER — OFFICE VISIT (OUTPATIENT)
Dept: URBAN - METROPOLITAN AREA CLINIC 111 | Facility: CLINIC | Age: 64
End: 2024-12-03
Payer: MEDICARE

## 2024-12-03 VITALS
TEMPERATURE: 97.7 F | HEIGHT: 64 IN | SYSTOLIC BLOOD PRESSURE: 133 MMHG | HEART RATE: 96 BPM | DIASTOLIC BLOOD PRESSURE: 87 MMHG | BODY MASS INDEX: 30.22 KG/M2 | WEIGHT: 177 LBS

## 2024-12-03 DIAGNOSIS — K64.4 INTERNAL AND EXTERNAL HEMORRHOIDS WITHOUT COMPLICATION: ICD-10-CM

## 2024-12-03 DIAGNOSIS — Z86.0101 HISTORY OF ADENOMATOUS POLYP OF COLON: ICD-10-CM

## 2024-12-03 DIAGNOSIS — K44.9 HIATAL HERNIA: ICD-10-CM

## 2024-12-03 DIAGNOSIS — D12.6 TUBULOVILLOUS ADENOMA OF COLON: ICD-10-CM

## 2024-12-03 PROCEDURE — 99214 OFFICE O/P EST MOD 30 MIN: CPT | Performed by: PHYSICIAN ASSISTANT

## 2024-12-03 RX ORDER — LORATADINE 10 MG
1 TABLET TABLET ORAL ONCE A DAY
Status: ON HOLD | COMMUNITY

## 2024-12-03 RX ORDER — DICYCLOMINE HYDROCHLORIDE 20 MG/1
1 TABLET ORAL
Qty: 60 TABLETS | Refills: 3 | Status: ON HOLD | COMMUNITY

## 2024-12-03 RX ORDER — AMITRIPTYLINE HYDROCHLORIDE 25 MG/1
1 TABLET AT BEDTIME TABLET, FILM COATED ORAL ONCE A DAY
Qty: 90 TABLET | Refills: 3 | Status: ON HOLD | COMMUNITY

## 2024-12-03 RX ORDER — ATORVASTATIN CALCIUM 10 MG/1
1 TABLET TABLET, FILM COATED ORAL ONCE A DAY
Status: ACTIVE | COMMUNITY

## 2024-12-03 RX ORDER — AMITRIPTYLINE HYDROCHLORIDE 25 MG/1
TAKE 1 TABLET BY MOUTH EVERY DAY AT BEDTIME TABLET, FILM COATED ORAL
Qty: 90 TABLET | Refills: 1 | Status: ON HOLD | COMMUNITY

## 2024-12-03 NOTE — HPI-TODAY'S VISIT:
65 y/o female here to follow up after EGD/colon. Seen by me on 10/29 for iron deficiency anemia, h/o adenomatous colon polyp, and internal hemorrhoids. Pt was not having overt bleeding but has h/o bleeding from hemorrhoids. Underwent banding last year. She was taking an iron supplement. S/p scopes on 11/11 by Dr. German. EGD revealing 10 cm hiatal hernia. Colon revealing 15 mm polyp in transverse colon, diverticulosis in left colon, and large internal/external hemorrhoids. Path revealing gastropathy and tubulovillous adenoma. Repeat colon recommended in 1 year. She has h/o large TVA polyp in 2022 as well.  She is not having any bleeding. Labs from 11/18: Hgb 12.2 & Hct 39  She was taking Meloxicam but stopped it 2-3 months ago she thinks. She did take Ibuprofen more recently and was taking it a few times a week.

## 2025-01-28 ENCOUNTER — OFFICE VISIT (OUTPATIENT)
Dept: URBAN - METROPOLITAN AREA CLINIC 12 | Facility: CLINIC | Age: 65
End: 2025-01-28

## 2025-01-28 VITALS
WEIGHT: 170 LBS | SYSTOLIC BLOOD PRESSURE: 138 MMHG | BODY MASS INDEX: 29.02 KG/M2 | HEIGHT: 64 IN | HEART RATE: 85 BPM | DIASTOLIC BLOOD PRESSURE: 87 MMHG | TEMPERATURE: 97.4 F

## 2025-01-28 RX ORDER — DICYCLOMINE HYDROCHLORIDE 20 MG/1
1 TABLET ORAL
Qty: 60 TABLETS | Refills: 3 | Status: ON HOLD | COMMUNITY

## 2025-01-28 RX ORDER — LORATADINE 10 MG
1 TABLET TABLET ORAL ONCE A DAY
Status: ON HOLD | COMMUNITY

## 2025-01-28 RX ORDER — AMITRIPTYLINE HYDROCHLORIDE 25 MG/1
1 TABLET AT BEDTIME TABLET, FILM COATED ORAL ONCE A DAY
Qty: 90 TABLET | Refills: 3 | Status: ON HOLD | COMMUNITY

## 2025-01-28 RX ORDER — ATORVASTATIN CALCIUM 10 MG/1
1 TABLET TABLET, FILM COATED ORAL ONCE A DAY
Status: ACTIVE | COMMUNITY

## 2025-01-28 RX ORDER — HYDROCORTISONE 25 MG/G
1 APPLICATION CREAM TOPICAL
Qty: 1 | Refills: 3 | OUTPATIENT
Start: 2025-01-28 | End: 2025-05-28

## 2025-01-28 RX ORDER — AMITRIPTYLINE HYDROCHLORIDE 25 MG/1
TAKE 1 TABLET BY MOUTH EVERY DAY AT BEDTIME TABLET, FILM COATED ORAL
Qty: 90 TABLET | Refills: 1 | Status: ON HOLD | COMMUNITY

## 2025-01-28 NOTE — HPI-TODAY'S VISIT:
63 y/o female here to discuss hemorrhoid treatment . Underwent banding last year.  S/p scopes on 11/11 by Dr. German. EGD revealing 10 cm hiatal hernia. Colon revealing 15 mm polyp in transverse colon, diverticulosis in left colon, and large internal/external hemorrhoids. Path revealing gastropathy and tubulovillous adenoma. Repeat colon recommended in 1 year. She has h/o large TVA polyp in 2022 as well.  She is not having any bleeding. Labs from 11/18: Hgb 12.2 & Hct 39  She was taking Meloxicam but stopped it 2-3 months ago she thinks. She did take Ibuprofen more recently and was taking it a few times a week.

## 2025-04-16 ENCOUNTER — APPOINTMENT (OUTPATIENT)
Dept: URBAN - METROPOLITAN AREA CLINIC 28 | Facility: CLINIC | Age: 65
Setting detail: DERMATOLOGY
End: 2025-04-16

## 2025-04-16 DIAGNOSIS — D485 NEOPLASM OF UNCERTAIN BEHAVIOR OF SKIN: ICD-10-CM

## 2025-04-16 DIAGNOSIS — L57.0 ACTINIC KERATOSIS: ICD-10-CM | Status: INADEQUATELY CONTROLLED

## 2025-04-16 DIAGNOSIS — I78.8 OTHER DISEASES OF CAPILLARIES: ICD-10-CM

## 2025-04-16 PROBLEM — D48.5 NEOPLASM OF UNCERTAIN BEHAVIOR OF SKIN: Status: ACTIVE | Noted: 2025-04-16

## 2025-04-16 PROCEDURE — ? PRESCRIPTION

## 2025-04-16 PROCEDURE — 99203 OFFICE O/P NEW LOW 30 MIN: CPT | Mod: 25

## 2025-04-16 PROCEDURE — ? BIOPSY BY SHAVE METHOD

## 2025-04-16 PROCEDURE — ? PRESCRIPTION MEDICATION MANAGEMENT

## 2025-04-16 PROCEDURE — 11102 TANGNTL BX SKIN SINGLE LES: CPT

## 2025-04-16 PROCEDURE — ? COUNSELING

## 2025-04-16 PROCEDURE — 11103 TANGNTL BX SKIN EA SEP/ADDL: CPT

## 2025-04-16 RX ORDER — FLUOROURACIL 5 MG/G
CREAM TOPICAL
Qty: 40 | Refills: 0 | Status: ERX | COMMUNITY
Start: 2025-04-16

## 2025-04-16 RX ADMIN — FLUOROURACIL: 5 CREAM TOPICAL at 00:00

## 2025-04-16 ASSESSMENT — LOCATION SIMPLE DESCRIPTION DERM
LOCATION SIMPLE: RIGHT LOWER LEG
LOCATION SIMPLE: RIGHT UPPER ARM
LOCATION SIMPLE: NOSE
LOCATION SIMPLE: LEFT CHEEK
LOCATION SIMPLE: RIGHT LIP
LOCATION SIMPLE: RIGHT CHEEK

## 2025-04-16 ASSESSMENT — LOCATION DETAILED DESCRIPTION DERM
LOCATION DETAILED: NASAL DORSUM
LOCATION DETAILED: RIGHT LATERAL PROXIMAL CALF
LOCATION DETAILED: LEFT INFERIOR CENTRAL MALAR CHEEK
LOCATION DETAILED: NASAL SUPRATIP
LOCATION DETAILED: RIGHT LATERAL PROXIMAL UPPER ARM
LOCATION DETAILED: RIGHT INFERIOR MEDIAL MALAR CHEEK
LOCATION DETAILED: RIGHT UPPER CUTANEOUS LIP

## 2025-04-16 ASSESSMENT — LOCATION ZONE DERM
LOCATION ZONE: LIP
LOCATION ZONE: FACE
LOCATION ZONE: LEG
LOCATION ZONE: NOSE
LOCATION ZONE: ARM

## 2025-04-16 NOTE — PROCEDURE: BIOPSY BY SHAVE METHOD
Detail Level: Detailed
Depth Of Biopsy: dermis
Was A Bandage Applied: Yes
Size Of Lesion In Cm: 0.4
X Size Of Lesion In Cm: 0
Biopsy Type: H and E
Biopsy Method: Personna blade
Anesthesia Type: 1% lidocaine with epinephrine
Anesthesia Volume In Cc: 0.5
Hemostasis: Electrocautery and Aluminum Chloride
Wound Care: Petrolatum
Dressing: bandage
Destruction After The Procedure: No
Type Of Destruction Used: Curettage
Curettage Text: The wound bed was treated with curettage after the biopsy was performed.
Cryotherapy Text: The wound bed was treated with cryotherapy after the biopsy was performed.
Electrodesiccation Text: The wound bed was treated with electrodesiccation after the biopsy was performed.
Electrodesiccation And Curettage Text: The wound bed was treated with electrodesiccation and curettage after the biopsy was performed.
Silver Nitrate Text: The wound bed was treated with silver nitrate after the biopsy was performed.
Lab: [id number]
Lab Facility: [id number]
Medical Necessity Information: It is in your best interest to select a reason for this procedure from the list below. All of these items fulfill various CMS LCD requirements except the new and changing color options.
Consent: Written consent was obtained and risks were reviewed including but not limited to scarring, infection, bleeding, scabbing, incomplete removal, nerve damage and allergy to anesthesia.
Post-Care Instructions: I reviewed with the patient in detail post-care instructions. Patient is to keep the biopsy site dry overnight, and then apply bacitracin twice daily until healed. Patient may apply hydrogen peroxide soaks to remove any crusting.
Notification Instructions: Patient will be notified of biopsy results. However, patient instructed to call the office if not contacted within 2 weeks.
Billing Type: Third-Party Bill
Information: Selecting Yes will display possible errors in your note based on the variables you have selected. This validation is only offered as a suggestion for you. PLEASE NOTE THAT THE VALIDATION TEXT WILL BE REMOVED WHEN YOU FINALIZE YOUR NOTE. IF YOU WANT TO FAX A PRELIMINARY NOTE YOU WILL NEED TO TOGGLE THIS TO 'NO' IF YOU DO NOT WANT IT IN YOUR FAXED NOTE.

## 2025-04-16 NOTE — HPI: OTHER
Condition:: AKs
Please Describe Your Condition:: The patient requests a check of her nose, she gets pink or red things on her face and isn't sure what to look for that might be worrisome.

## 2025-04-16 NOTE — HPI: EVALUATION OF SKIN LESION(S)
Hpi Title: Evaluation of Skin Lesions
Additional History: Pink spot that has been there since [place]. It gets flakey at times. She has had warts on her hands before and that is what she thought this spot was, so she was treating it with compound W\\n\\nSpikey spot on right upper arm that also showed up around the same time. She was also treating this with compound w.\\n\\nThis is her first time at the dermatologist. Her mother had spots removed, maybe cancerous?

## 2025-04-16 NOTE — PROCEDURE: PRESCRIPTION MEDICATION MANAGEMENT
Initiate Treatment: Fluorouracil cream nightly on the bridge of the nose for 10 days- 2 weeks
Render In Strict Bullet Format?: No
Detail Level: Zone

## 2025-04-16 NOTE — PROCEDURE: COUNSELING
Detail Level: Zone
Patient Specific Counseling (Will Not Stick From Patient To Patient): -\\n\\n\\nDiscussed that patient has pre cancers, AKs,  on the bridge of the nose-nature reviewed and AK/SCC relationship\\nWill give her AAD handout on actinic keratoses to learn more about\\nDiscussed that we either treat with cryotherapy or pre cancer creams vs no treatment, observation and risks of each/benefits each\\nPatient elects to try pre cancer cream\\nWill write for fluorouracil cream nightly for about 10 days- 2 weeks. Proper usage reviewed. Wash hands after use. Stop early with severe rash. Call with any concerns. proper use discussed. Vaseline pet to lips and eyelids before use to protect delicate areas. \\n\\nAdd SPF every morning as long term care
Detail Level: Simple
Patient Specific Counseling (Will Not Stick From Patient To Patient): --\\n\\nADD daily SPF/pp

## 2025-07-03 ENCOUNTER — APPOINTMENT (OUTPATIENT)
Dept: URBAN - METROPOLITAN AREA CLINIC 28 | Facility: CLINIC | Age: 65
Setting detail: DERMATOLOGY
End: 2025-07-03

## 2025-07-03 DIAGNOSIS — L82.1 OTHER SEBORRHEIC KERATOSIS: ICD-10-CM

## 2025-07-03 DIAGNOSIS — L71.8 OTHER ROSACEA: ICD-10-CM | Status: INADEQUATELY CONTROLLED

## 2025-07-03 DIAGNOSIS — Z12.83 ENCOUNTER FOR SCREENING FOR MALIGNANT NEOPLASM OF SKIN: ICD-10-CM

## 2025-07-03 DIAGNOSIS — D22 MELANOCYTIC NEVI: ICD-10-CM

## 2025-07-03 DIAGNOSIS — L84 CORNS AND CALLOSITIES: ICD-10-CM

## 2025-07-03 DIAGNOSIS — L81.4 OTHER MELANIN HYPERPIGMENTATION: ICD-10-CM

## 2025-07-03 DIAGNOSIS — L56.8 OTHER SPECIFIED ACUTE SKIN CHANGES DUE TO ULTRAVIOLET RADIATION: ICD-10-CM

## 2025-07-03 PROBLEM — D22.5 MELANOCYTIC NEVI OF TRUNK: Status: ACTIVE | Noted: 2025-07-03

## 2025-07-03 PROCEDURE — ? COUNSELING

## 2025-07-03 ASSESSMENT — LOCATION DETAILED DESCRIPTION DERM
LOCATION DETAILED: MIDDLE STERNUM
LOCATION DETAILED: LEFT SUPERIOR UPPER BACK
LOCATION DETAILED: SUPERIOR LUMBAR SPINE
LOCATION DETAILED: LEFT SUPERIOR MEDIAL UPPER BACK
LOCATION DETAILED: SUBXIPHOID
LOCATION DETAILED: UPPER STERNUM
LOCATION DETAILED: RIGHT MEDIAL UPPER BACK
LOCATION DETAILED: LOWER STERNUM
LOCATION DETAILED: SUPERIOR THORACIC SPINE
LOCATION DETAILED: EPIGASTRIC SKIN
LOCATION DETAILED: RIGHT LATERAL PLANTAR 1ST TOE
LOCATION DETAILED: RIGHT ANTERIOR SHOULDER
LOCATION DETAILED: LEFT INFERIOR CENTRAL MALAR CHEEK
LOCATION DETAILED: RIGHT INFERIOR CENTRAL MALAR CHEEK

## 2025-07-03 ASSESSMENT — LOCATION SIMPLE DESCRIPTION DERM
LOCATION SIMPLE: RIGHT UPPER BACK
LOCATION SIMPLE: CHEST
LOCATION SIMPLE: RIGHT CHEEK
LOCATION SIMPLE: ABDOMEN
LOCATION SIMPLE: LEFT CHEEK
LOCATION SIMPLE: PLANTAR SURFACE OF RIGHT 1ST TOE
LOCATION SIMPLE: LOWER BACK
LOCATION SIMPLE: UPPER BACK
LOCATION SIMPLE: RIGHT SHOULDER
LOCATION SIMPLE: LEFT UPPER BACK

## 2025-07-03 ASSESSMENT — LOCATION ZONE DERM
LOCATION ZONE: TOE
LOCATION ZONE: ARM
LOCATION ZONE: TRUNK
LOCATION ZONE: FACE

## 2025-07-03 NOTE — HPI: FULL BODY SKIN EXAMINATION
What Is The Reason For Today's Visit?: Full Body Skin Examination
What Is The Reason For Today's Visit? (Being Monitored For X): concerning skin lesions on an annual basis
Additional History: No H/O Skin Cancer- ABN signed \\n\\nH/O warts\\n\\nNever had a full body exam before

## 2025-07-03 NOTE — PROCEDURE: COUNSELING
Detail Level: Zone
Skin Checks Recommendations: SSE q month\\nFBSE with derm q year
Detail Level: Generalized
Patient Specific Counseling (Will Not Stick From Patient To Patient): —\\n\\nPt does not want treatment at this time\\nRosacea Handout and Trigger list given-pp every day and avoid triggers\\nlaser and Rx treatment discussed-RTC if she changes mind about tx\\n\\nElta Clear q day recommended
Detail Level: Detailed
Patient Specific Counseling (Will Not Stick From Patient To Patient): --\\n\\nrisks reviewed and pp measures discussed.

## 2025-07-03 NOTE — HPI: OTHER
Condition:: Rosacea
Please Describe Your Condition:: The patient has trouble finding sunscreens she likes because she breaks out in pimple bumps from sunscreens when in the sun.\\n\\nShe has never been diagnosed with rosacea, but has a long history of pink cheeks and easy flush/blush. She is often in the sun.